# Patient Record
Sex: MALE | Race: WHITE | NOT HISPANIC OR LATINO | Employment: UNEMPLOYED | URBAN - METROPOLITAN AREA
[De-identification: names, ages, dates, MRNs, and addresses within clinical notes are randomized per-mention and may not be internally consistent; named-entity substitution may affect disease eponyms.]

---

## 2024-11-25 ENCOUNTER — APPOINTMENT (OUTPATIENT)
Dept: PRIMARY CARE | Facility: CLINIC | Age: 24
End: 2024-11-25

## 2024-11-25 VITALS
DIASTOLIC BLOOD PRESSURE: 74 MMHG | SYSTOLIC BLOOD PRESSURE: 126 MMHG | HEIGHT: 72 IN | OXYGEN SATURATION: 97 % | WEIGHT: 182 LBS | BODY MASS INDEX: 24.65 KG/M2 | HEART RATE: 76 BPM

## 2024-11-25 DIAGNOSIS — Z00.00 ROUTINE GENERAL MEDICAL EXAMINATION AT A HEALTH CARE FACILITY: Primary | ICD-10-CM

## 2024-11-25 PROCEDURE — 99395 PREV VISIT EST AGE 18-39: CPT | Performed by: FAMILY MEDICINE

## 2024-11-25 PROCEDURE — 3008F BODY MASS INDEX DOCD: CPT | Performed by: FAMILY MEDICINE

## 2024-11-25 ASSESSMENT — PATIENT HEALTH QUESTIONNAIRE - PHQ9
1. LITTLE INTEREST OR PLEASURE IN DOING THINGS: NOT AT ALL
2. FEELING DOWN, DEPRESSED OR HOPELESS: NOT AT ALL
SUM OF ALL RESPONSES TO PHQ9 QUESTIONS 1 AND 2: 0

## 2024-11-25 NOTE — PROGRESS NOTES
Subjective   Dax Martinez is a 23 y.o. male who presents for Annual Exam (Physical).    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     Objective     /74   Pulse 76   Wt 82.6 kg (182 lb)   SpO2 97%   BMI 24.86 kg/m²      Physical Exam  GEN: A+O, no acute distress  HEENT: NC/AT, Oropharynx clear, no exudates, TM visualized, Extraoccular muscles intact, no facial droop; no thyromegaly or cervical LAD  RESP: CTAB, no wheezes   CV: RRR, no murmurs  ABD: soft, non-tender, + BS  SKIN: no rashes or bruising, no peripheral edema   NEURO: CN II-XII grossly intact, moves all extremities equally, no tremor   PSYCH: normal affect, appropriate mood     Assessment/Plan   Problem List Items Addressed This Visit    None       150 mins of aerobic exercise is advised for good cardiovascular health and maintain a healthy weight.      Please try to work on maintaining a healthy body weight, with a BMI close to or at a BMI 19-25. BMI is good at 24     Recommend a whole-foods, plant-based diet, filled with fresh fruits, vegetables, seeds, beans, nuts and berries.     Your blood pressure should be BELOW 135/85. Your bp was 110/74. If your readings were high today, please consider an at home blood pressure monitor to keep a log to bring with you to your next appointment.      Of course, do not text and drive and always wear a seat belt.      Please get fasting labs done in the next 1-2 week (nothing to eat or drink for 10 hours prior to blood draw EXCEPT black coffee and water), we will call you with the results. If you do not hear from up 2-3 business days after you had your labs drawn, please call the office at [phone] ([place]).       Follow up in  1 year or sooner as needed for acute illness or concern.        Carolina Ford DO, MSMed, ABOM  [address]   [address]   [place] [address]  Ph. [phone]  Fx. [phone]

## 2024-11-25 NOTE — PATIENT INSTRUCTIONS
150 mins of aerobic exercise is advised for good cardiovascular health and maintain a healthy weight.      Please try to work on maintaining a healthy body weight, with a BMI close to or at a BMI 19-25. BMI is good at 24     Recommend a whole-foods, plant-based diet, filled with fresh fruits, vegetables, seeds, beans, nuts and berries.     Your blood pressure should be BELOW 135/85. Your bp was 110/74. If your readings were high today, please consider an at home blood pressure monitor to keep a log to bring with you to your next appointment.      Of course, do not text and drive and always wear a seat belt.      Please get fasting labs done in the next 1-2 week (nothing to eat or drink for 10 hours prior to blood draw EXCEPT black coffee and water), we will call you with the results. If you do not hear from up 2-3 business days after you had your labs drawn, please call the office at [phone] ([place]).       Follow up in 1 year or sooner as needed for acute illness or concern.